# Patient Record
Sex: FEMALE | URBAN - METROPOLITAN AREA
[De-identification: names, ages, dates, MRNs, and addresses within clinical notes are randomized per-mention and may not be internally consistent; named-entity substitution may affect disease eponyms.]

---

## 2022-04-20 ENCOUNTER — PATIENT OUTREACH (OUTPATIENT)
Dept: ONCOLOGY | Facility: CLINIC | Age: 52
End: 2022-04-20

## 2022-04-20 ENCOUNTER — DOCUMENTATION ONLY (OUTPATIENT)
Dept: OTHER | Age: 52
End: 2022-04-20

## 2022-04-20 ENCOUNTER — TRANSCRIBE ORDERS (OUTPATIENT)
Dept: OTHER | Age: 52
End: 2022-04-20

## 2022-04-20 DIAGNOSIS — C20 RECTAL CANCER (H): Primary | ICD-10-CM

## 2022-04-21 ENCOUNTER — PATIENT OUTREACH (OUTPATIENT)
Dept: SURGERY | Facility: CLINIC | Age: 52
End: 2022-04-21

## 2022-04-21 DIAGNOSIS — C20 RECTAL CANCER (H): Primary | ICD-10-CM

## 2022-04-21 NOTE — PROGRESS NOTES
New Cancer    Referring/Requesting provider and Health care System: [facility]       Indication/Diagnosis for consultation: new rectal cancer     Patient states that two referrals were sent for her. One for [facility] and one for [facility]. She states she is already set up with [facility] so she will defer this referral for now.